# Patient Record
(demographics unavailable — no encounter records)

---

## 2025-01-17 NOTE — ASSESSMENT
[FreeTextEntry1] : 72yoM with vascular insufficiency and left shin pain likely related to claudication.  xrays negative for acute osseous abnormalities.  Recommend patient to consult with vascular surgeon.  -Activities as tolerated -Rest, ice, compression, elevation, tylenol prn pain -All questions answered -F/u prn

## 2025-01-17 NOTE — HISTORY OF PRESENT ILLNESS
[Gradual] : gradual [7] : 7 [Dull/Aching] : dull/aching [Throbbing] : throbbing [Rest] : rest [Meds] : meds [Intermittent] : intermittent [Part time] : Work status: part time [de-identified] : Patient is here today for his left tibia/fibula. Pain began 5-6 weeks ago. Patient states that he got out of bed when he feel directly on the lower leg. Patient noting sharp intermittent pain over anterior tibia that is worse with movement. Patient noting associated swelling. Patient went to PCP 6 weeks ago where he was given an antibiotic and topical cream for which he has since completed. Denies taking medications for the pain. Denies fever/chills. [] : Post Surgical Visit: no [FreeTextEntry1] : Left knee  [FreeTextEntry3] : 5-6 weeks ago [FreeTextEntry5] : NKI [de-identified] : movement [de-identified] : 6 weeks ago [de-identified] : PCP [de-identified] : [facility] at [facility]

## 2025-01-17 NOTE — PHYSICAL EXAM
[de-identified] : LLE with discoloration of mid shin to ankle consistent with vascular insufficiency, pitting edema, no cellulitis. No skin ulcerations.  generalized ttp throughout the shin. motor 4/5 ehl/ta/gs, sensation grossly intact to light touch dp/sp/s/s/tn.  dp/pt 1+ xrays: tib/fib 2 views - acute osseous abnormalities